# Patient Record
Sex: FEMALE | Race: WHITE | NOT HISPANIC OR LATINO
[De-identification: names, ages, dates, MRNs, and addresses within clinical notes are randomized per-mention and may not be internally consistent; named-entity substitution may affect disease eponyms.]

---

## 2017-09-07 ENCOUNTER — CHARTING TRANS (OUTPATIENT)
Dept: OTHER | Age: 32
End: 2017-09-07

## 2017-09-07 ENCOUNTER — LAB SERVICES (OUTPATIENT)
Dept: OTHER | Age: 32
End: 2017-09-07

## 2017-09-07 LAB
APPEARANCE: CLEAR
BILIRUBIN: NORMAL
COLOR: YELLOW
GLUCOSE U: NORMAL
KETONES: NORMAL
LEUKOCYTES: NORMAL
NITRITE: NORMAL
OCCULT BLOOD: NORMAL
PH: 6
PROTEIN: NORMAL
URINE SPEC GRAVITY: 1.01
UROBILINOGEN: 0.2

## 2017-09-07 ASSESSMENT — PAIN SCALES - GENERAL: PAINLEVEL_OUTOF10: 0

## 2017-10-10 ENCOUNTER — CHARTING TRANS (OUTPATIENT)
Dept: OTHER | Age: 32
End: 2017-10-10

## 2018-11-03 VITALS
BODY MASS INDEX: 25.07 KG/M2 | WEIGHT: 156 LBS | DIASTOLIC BLOOD PRESSURE: 76 MMHG | HEIGHT: 66 IN | HEART RATE: 96 BPM | SYSTOLIC BLOOD PRESSURE: 119 MMHG

## 2022-07-29 ENCOUNTER — HOSPITAL ENCOUNTER (OUTPATIENT)
Facility: HOSPITAL | Age: 37
Discharge: HOME | End: 2022-07-29
Attending: OBSTETRICS & GYNECOLOGY | Admitting: OBSTETRICS & GYNECOLOGY
Payer: MEDICAID

## 2022-07-29 ENCOUNTER — APPOINTMENT (OUTPATIENT)
Dept: ULTRASOUND IMAGING | Facility: HOSPITAL | Age: 37
End: 2022-07-29
Attending: OBSTETRICS & GYNECOLOGY
Payer: MEDICAID

## 2022-07-29 LAB
BILIRUB UR QL: NEGATIVE
CLARITY UR: CLEAR
COLOR UR: YELLOW
GLUCOSE UR-MCNC: NEGATIVE MG/DL
KETONES UR-MCNC: NEGATIVE MG/DL
NITRITE UR QL STRIP.AUTO: NEGATIVE
PH UR: 6.5 [PH] (ref 5–8)
PROT UR-MCNC: NEGATIVE MG/DL
SP GR UR STRIP: 1.01 (ref 1–1.03)
UROBILINOGEN UR STRIP-ACNC: 0.2

## 2022-07-29 PROCEDURE — 81001 URINALYSIS AUTO W/SCOPE: CPT | Performed by: OBSTETRICS & GYNECOLOGY

## 2022-07-29 PROCEDURE — 76815 OB US LIMITED FETUS(S): CPT | Performed by: OBSTETRICS & GYNECOLOGY

## 2022-07-29 PROCEDURE — 87086 URINE CULTURE/COLONY COUNT: CPT | Performed by: OBSTETRICS & GYNECOLOGY

## 2022-07-29 PROCEDURE — 99204 OFFICE O/P NEW MOD 45 MIN: CPT

## 2022-07-29 PROCEDURE — 81015 MICROSCOPIC EXAM OF URINE: CPT | Performed by: OBSTETRICS & GYNECOLOGY

## 2022-07-30 NOTE — PROGRESS NOTES
Clarissa HOUSTON Triage Addendum  35yo  @ 23.5 wks who presents w brown pink dc w wiping and noted brown dc in underwear today. No recent IC or strenuous activity except was on a kid's ride 2 days ago with a lot of shaking and jerking. Denies s/sx PTL. NL FM. Had similar spotting 3 wks ago after strenuous strength training. No other probs during this or last preg. PN care in [place]. Pt planning to travel to [place] [facility] s/p ablation 16 yrs ago. PSH: jaw tumor resection and ablation  PgynHx: no abnl pap or STDs  POBHx: 18 FTNSVD - no comps. Deliv @ [facility]    PE: WDWN in NAD  Abd: soft, NT, gravid  Pelvic: EG, V, Cx: wnl, no blood noted. LTC medium consistency    Formal US: vtx, no previa, MVP 6.8  EFM: no UCs. NL FHT    Dx: Threatened PTL,  bleeding > 22 wks    Disp: Home. Advise risk is low, but if travels to [address] risk of needing to seek care and being stuck there. No strenuous activity or IC.      Jacy Madera MD  2022 8:20 PM

## 2022-07-30 NOTE — PROGRESS NOTES
Rec'd report from [facility], Pt arrived back from [address], Dr Chen Means informed about US report, plan to evaluate pt.     Tonie Huang RN

## 2022-07-30 NOTE — TRIAGE
[facility]      Triage Note    Nash Mcnair Patient Status:  Outpatient    1985 MRN [id number]   Location [address] Attending Rebeca Forrester MD   Hosp Day # 0 PCP No primary care provider on file.  Para:   Estimated Date of Delivery: 22  Gestation: 23w5d    Chief Complaint     Vaginal Bleeding          Allergies:    Mold                    Tightness in Throat    Orders Placed This Encounter      Urinalysis with Culture Reflex      UA Microscopic only, urine      Urine Culture, Routine      No results found for: WBC, HGB, HCT, PLT, CREATSERUM, BUN, NA, K, CL, CO2, GLU, CA, ALB, ALKPHO, BILT, TP, AST, ALT, PTT, INR, PT, T4F, TSH, TSHREFLEX, RENETTA, LIP, GGT, PSA, DDIMER, ESRML, ESRPF, CRP, BNP, MG, PHOS, TROP, TROPHS, CK, CKMB, FANNY, RPR, B12, ETOH, POCGLU, FFN    Clinitek UA  Lab Results   Component Value Date    GLUUR Negative 2022    SPECGRAVITY 1.010 2022       UA  Lab Results   Component Value Date    COLORUR Yellow 2022    CLARITY Clear 2022    SPECGRAVITY 1.010 2022    PROUR Negative 2022    GLUUR Negative 2022    KETUR Negative 2022    BILUR Negative 2022    BLOODURINE Trace-Intact (A) 2022    NITRITE Negative 2022    UROBILINOGEN 0.2 2022    LEUUR Trace (A) 2022       There were no vitals filed for this visit. NST                                                   Uterine Irritability: Yes           Contractions: Not present                                                                                       Nash Mcnair is a  at 23w5d presenting with c/o vaginal spotting. Had previous vaginal spotting of unknown cause earlier in pregnancy. Reports good FM, denies LOF, denies frequent lower ABD cramping. US unremarkable, UA unremarkable. Evaluated in person by Dr Aide De La Garza. PT cleared for DC home.     Additional Comments       Patient presents with:  Vaginal Bleeding: spotting all day        Kenn Angelucci, RN  7/30/2022 5:54 AM

## 2022-07-30 NOTE — PROGRESS NOTES
Discharged to home per ambulatory in stable condition with written and verbal instructions. Encouraged pt to RTC for decreased fetal movement, vaginal bleeding, leaking of vaginal fluid, or more than 4 contractions in one hour. Patient verbalizes understanding of information given.      Andrew Redman RN